# Patient Record
Sex: FEMALE | Race: WHITE
[De-identification: names, ages, dates, MRNs, and addresses within clinical notes are randomized per-mention and may not be internally consistent; named-entity substitution may affect disease eponyms.]

---

## 2021-02-16 VITALS
DIASTOLIC BLOOD PRESSURE: 73 MMHG | SYSTOLIC BLOOD PRESSURE: 123 MMHG | SYSTOLIC BLOOD PRESSURE: 123 MMHG | DIASTOLIC BLOOD PRESSURE: 73 MMHG | SYSTOLIC BLOOD PRESSURE: 123 MMHG | DIASTOLIC BLOOD PRESSURE: 73 MMHG

## 2021-04-09 ENCOUNTER — HOSPITAL ENCOUNTER (OUTPATIENT)
Dept: HOSPITAL 63 - RAD | Age: 51
End: 2021-04-09
Attending: NURSE PRACTITIONER
Payer: COMMERCIAL

## 2021-04-09 DIAGNOSIS — M76.892: ICD-10-CM

## 2021-04-09 DIAGNOSIS — M25.462: ICD-10-CM

## 2021-04-09 DIAGNOSIS — M25.062: Primary | ICD-10-CM

## 2021-04-09 PROCEDURE — 73562 X-RAY EXAM OF KNEE 3: CPT

## 2021-04-09 NOTE — RAD
3 views left knee



HISTORY: Left knee pain



AP lateral oblique views



There is minimal marginal spurring of the lateral and medial compartments. There is no interruption o
f cortex to suggest a fracture. There is a moderate sized dense effusion seen on the lateral view.



IMPRESSION:



Moderate hemarthrosis. There is no acute bony abnormality seen however a rapid hemarthrosis can be se
condary to an occult tibial plateau fracture or an ACL tear.



Electronically signed by: Adrian Machado III, MD (4/9/2021 4:57 PM) [facility]

## 2022-03-01 ENCOUNTER — HOSPITAL ENCOUNTER (OUTPATIENT)
Dept: HOSPITAL 63 - US | Age: 52
End: 2022-03-01
Attending: FAMILY MEDICINE
Payer: COMMERCIAL

## 2022-03-01 DIAGNOSIS — R94.6: ICD-10-CM

## 2022-03-01 DIAGNOSIS — E03.9: ICD-10-CM

## 2022-03-01 DIAGNOSIS — E04.1: Primary | ICD-10-CM

## 2022-03-01 PROCEDURE — 76536 US EXAM OF HEAD AND NECK: CPT

## 2022-03-01 NOTE — RAD
US THYROID



History: Hypothyroidism



Comparison:  None.



Technique: Multiple grayscale and color Doppler images of the thyroid gland were obtained.



Findings: 

Right thyroid lobe: 3.9 x 1.6 x 1.8 cm. 

Left thyroid lobe: 4.1 x 1.6 x 1.6 cm. 

Isthmus: 0.2 cm.

The thyroid gland is relatively homogeneous with normal vascularity. There is a single left thyroid n
odule.

 

Nodule #1.

Size: 5 x 4 x 5 mm

Location: Left lower.

Characteristics: Solid, hypoechoic, circumscribed, wider than tall, no echogenic foci

ACR TI-RADS risk category: TR4 (4-6 points): FNA if 1.5 cm, follow-up if 1-1.4 cm in 1, 2, 3, and 5 y
ears.

Disposition: No follow-up required.



IMPRESSION:

1.  Normal-sized relatively homogeneous thyroid with a single nodule which is below size threshold fo
r follow-up or FNA.



ACR Thyroid Imaging, Reporting And Data System (TI-RADS): White Paper Of The ACR TI-RADS Committee. J
ournal of the American College of Radiology, volume 14, issue 5, pages 587-595 (May 2017).



Electronically signed by: Janes Reyes MD (3/1/2022 9:37 AM) [id number]

## 2022-05-02 ENCOUNTER — HOSPITAL ENCOUNTER (OUTPATIENT)
Dept: HOSPITAL 63 - CT | Age: 52
End: 2022-05-02
Attending: FAMILY MEDICINE
Payer: COMMERCIAL

## 2022-05-02 DIAGNOSIS — R10.84: ICD-10-CM

## 2022-05-02 DIAGNOSIS — R63.5: Primary | ICD-10-CM

## 2022-05-02 DIAGNOSIS — M51.37: ICD-10-CM

## 2022-05-02 DIAGNOSIS — Z90.710: ICD-10-CM

## 2022-05-02 DIAGNOSIS — R60.9: ICD-10-CM

## 2022-05-02 DIAGNOSIS — R19.7: ICD-10-CM

## 2022-05-02 PROCEDURE — 74176 CT ABD & PELVIS W/O CONTRAST: CPT

## 2022-05-02 NOTE — RAD
Exam: CT abdomen/pelvis without intravenous contrast



Indication: Right upper quadrant pain and bloating, 30 pound weight gain in the last 2 months



Comparison: Chest abdomen pelvis 4/10/2018



Technique: Helical CT imaging performed of the abdomen and pelvis without the use of intravenous cont
rast. Sagittal and coronal reformats were obtained. 



One or more of the following individualized dose reduction techniques were utilized for this examinat
ion:  



1. Automated exposure control

2. Adjustment of the mA and/or kV according to patient size

3. Use of iterative reconstruction technique.



Findings:



Inherently limited evaluation without intravenous contrast.



Lower chest: Mild atelectasis in the right middle lobe. The heart is normal in size.



Liver: Normal noncontrast appearance of the liver.



Gallbladder/Biliary Tree: Normal.



Pancreas: Normal.



Spleen: Normal.



Adrenal Glands: Normal.



Kidneys/Ureters/Bladder: Normal. No hydronephrosis or nephrolithiasis.



Reproductive Organs: Uterus is surgically absent. No adnexal mass. A probable small right ovarian cys
t seen on 4/10/2018 has resolved. 



Stomach, small bowel, and colon: The stomach is normal. There is no small bowel obstruction. Grossly 
unremarkable colon, evaluation mildly limited by motion artifact. The appendix is normal.



Vasculature: No aortic aneurysm.



Lymph Nodes: No lymphadenopathy.



Peritoneum and retroperitoneum: No free fluid or free air.



Bones: No acute osseous abnormality. There is degenerative disc disease at L5-S1.



Miscellaneous: None



IMPRESSION:  No acute abnormality in the abdomen and pelvis.



Electronically signed by: Abiad Duff MD (5/2/2022 5:09 PM) [id number]

## 2022-05-03 ENCOUNTER — HOSPITAL ENCOUNTER (EMERGENCY)
Dept: HOSPITAL 63 - ER | Age: 52
Discharge: HOME | End: 2022-05-03
Payer: COMMERCIAL

## 2022-05-03 VITALS — SYSTOLIC BLOOD PRESSURE: 121 MMHG | DIASTOLIC BLOOD PRESSURE: 57 MMHG

## 2022-05-03 VITALS — BODY MASS INDEX: 42.15 KG/M2 | WEIGHT: 246.92 LBS | HEIGHT: 64 IN

## 2022-05-03 DIAGNOSIS — N30.90: Primary | ICD-10-CM

## 2022-05-03 DIAGNOSIS — M19.90: ICD-10-CM

## 2022-05-03 DIAGNOSIS — Z98.890: ICD-10-CM

## 2022-05-03 DIAGNOSIS — I25.2: ICD-10-CM

## 2022-05-03 DIAGNOSIS — Z88.2: ICD-10-CM

## 2022-05-03 DIAGNOSIS — Z91.018: ICD-10-CM

## 2022-05-03 DIAGNOSIS — E03.9: ICD-10-CM

## 2022-05-03 DIAGNOSIS — Z91.013: ICD-10-CM

## 2022-05-03 DIAGNOSIS — G43.909: ICD-10-CM

## 2022-05-03 DIAGNOSIS — Z91.041: ICD-10-CM

## 2022-05-03 DIAGNOSIS — I10: ICD-10-CM

## 2022-05-03 DIAGNOSIS — Z90.710: ICD-10-CM

## 2022-05-03 DIAGNOSIS — Z88.0: ICD-10-CM

## 2022-05-03 DIAGNOSIS — Z90.722: ICD-10-CM

## 2022-05-03 DIAGNOSIS — Z88.1: ICD-10-CM

## 2022-05-03 DIAGNOSIS — J45.909: ICD-10-CM

## 2022-05-03 LAB
ALBUMIN SERPL-MCNC: 3.6 G/DL (ref 3.4–5)
ALBUMIN/GLOB SERPL: 1.2 {RATIO} (ref 1–1.7)
ALP SERPL-CCNC: 105 U/L (ref 46–116)
ALT SERPL-CCNC: 40 U/L (ref 14–59)
ANION GAP SERPL CALC-SCNC: 6 MMOL/L (ref 6–14)
APTT PPP: YELLOW S
AST SERPL-CCNC: 21 U/L (ref 15–37)
BACTERIA #/AREA URNS HPF: (no result) /HPF
BASOPHILS # BLD AUTO: 0 X10^3/UL (ref 0–0.2)
BASOPHILS NFR BLD: 1 % (ref 0–3)
BILIRUB SERPL-MCNC: 1.5 MG/DL (ref 0.2–1)
BUN/CREAT SERPL: 10 (ref 6–20)
CA-I SERPL ISE-MCNC: 8 MG/DL (ref 7–20)
CALCIUM SERPL-MCNC: 10.8 MG/DL (ref 8.5–10.1)
CHLORIDE SERPL-SCNC: 105 MMOL/L (ref 98–107)
CO2 SERPL-SCNC: 29 MMOL/L (ref 21–32)
CREAT SERPL-MCNC: 0.8 MG/DL (ref 0.6–1)
EOSINOPHIL NFR BLD: 0.1 X10^3/UL (ref 0–0.7)
EOSINOPHIL NFR BLD: 3 % (ref 0–3)
ERYTHROCYTE [DISTWIDTH] IN BLOOD BY AUTOMATED COUNT: 13.1 % (ref 11.5–14.5)
FIBRINOGEN PPP-MCNC: (no result) MG/DL
GFR SERPLBLD BASED ON 1.73 SQ M-ARVRAT: 75.9 ML/MIN
GLOBULIN SER-MCNC: 3 G/DL (ref 2.2–3.8)
GLUCOSE SERPL-MCNC: 100 MG/DL (ref 70–99)
GLUCOSE UR STRIP-MCNC: (no result) MG/DL
HCT VFR BLD CALC: 37.7 % (ref 36–47)
HGB BLD-MCNC: 12.8 G/DL (ref 12–15.5)
LIPASE: 119 U/L (ref 73–393)
LYMPHOCYTES # BLD: 1.3 X10^3/UL (ref 1–4.8)
LYMPHOCYTES NFR BLD AUTO: 36 % (ref 24–48)
MCH RBC QN AUTO: 30 PG (ref 25–35)
MCHC RBC AUTO-ENTMCNC: 34 G/DL (ref 31–37)
MCV RBC AUTO: 87 FL (ref 79–100)
MONO #: 0.4 X10^3/UL (ref 0–1.1)
MONOCYTES NFR BLD: 10 % (ref 0–9)
NEUT #: 1.9 X10^3UL (ref 1.8–7.7)
NEUTROPHILS NFR BLD AUTO: 50 % (ref 31–73)
NITRITE UR QL STRIP: (no result)
PLATELET # BLD AUTO: 234 X10^3/UL (ref 140–400)
POTASSIUM SERPL-SCNC: 3.5 MMOL/L (ref 3.5–5.1)
PROT SERPL-MCNC: 6.6 G/DL (ref 6.4–8.2)
RBC # BLD AUTO: 4.34 X10^6/UL (ref 3.5–5.4)
RBC #/AREA URNS HPF: 0 /HPF (ref 0–2)
SODIUM SERPL-SCNC: 140 MMOL/L (ref 136–145)
SP GR UR STRIP: 1.01
SQUAMOUS #/AREA URNS LPF: (no result) /LPF
UROBILINOGEN UR-MCNC: 0.2 MG/DL
WBC # BLD AUTO: 3.7 X10^3/UL (ref 4–11)
WBC #/AREA URNS HPF: (no result) /HPF (ref 0–4)

## 2022-05-03 PROCEDURE — 83880 ASSAY OF NATRIURETIC PEPTIDE: CPT

## 2022-05-03 PROCEDURE — 96374 THER/PROPH/DIAG INJ IV PUSH: CPT

## 2022-05-03 PROCEDURE — 87186 SC STD MICRODIL/AGAR DIL: CPT

## 2022-05-03 PROCEDURE — 83690 ASSAY OF LIPASE: CPT

## 2022-05-03 PROCEDURE — 96376 TX/PRO/DX INJ SAME DRUG ADON: CPT

## 2022-05-03 PROCEDURE — 80053 COMPREHEN METABOLIC PANEL: CPT

## 2022-05-03 PROCEDURE — 36415 COLL VENOUS BLD VENIPUNCTURE: CPT

## 2022-05-03 PROCEDURE — 87077 CULTURE AEROBIC IDENTIFY: CPT

## 2022-05-03 PROCEDURE — 87086 URINE CULTURE/COLONY COUNT: CPT

## 2022-05-03 PROCEDURE — 85025 COMPLETE CBC W/AUTO DIFF WBC: CPT

## 2022-05-03 PROCEDURE — 96375 TX/PRO/DX INJ NEW DRUG ADDON: CPT

## 2022-05-03 PROCEDURE — 99285 EMERGENCY DEPT VISIT HI MDM: CPT

## 2022-05-03 PROCEDURE — 81001 URINALYSIS AUTO W/SCOPE: CPT

## 2022-05-03 NOTE — PHYS DOC
Past History


Past Medical History:  Arthritis, Asthma, Cancer, Hypertension, Hypothyroid, MI,

Migraines


Additional Past Medical Histor:  lupus, Raynauds, Sjogrens, urinary reflux


 (ANGELIKA KAN)


Past Surgical History:  Cancer Surgery, Cervical Fusion, , 

Hysterectomy, Oophorectomy, Other


Additional Past Surgical Histo:  R lumpectomy, uterine/cervical CA, rotator 

cuff,


 (ANGELIKA KAN)


Smoking:  Non-smoker


Alcohol Use:  None


Drug Use:  None


 (ANGELIKA KAN)





General Adult


EDM:


Chief Complaint:  ABDOMINAL PAIN





HPI:


HPI:





Patient is a 52-year-old female presents with right upper quadrant abdominal 

pain.  Patient states that she has had the pain for a few weeks.  Pain is worse 

after eating.  Patient reports that her poop has been oily and yellow in color. 

Patient states that pain is worse when she lays down and feels like she is 

swollen.  Patient's been taking Tylenol at home with no relief.  Patient had a 

CT and ultrasound done this week which both were negative for acute findings.


 (ANGELIKA KAN)





Review of Systems:


Review of Systems:


ROS


At least 10 ROS systems have been reviewed and are negative except as documented

in the HPI.


General: Negative except as outlined in HPI above.


Skin: Negative except as outlined in HPI above.


HEENT: Negative except as outlined in HPI above.


Neck: Negative except as outlined in HPI above.


Respiratory: Negative except as outlined in HPI above..


Cardiovascular: Negative except as outlined in HPI above.


Abdomen: Negative except as outlined in HPI above.


: Negative except as outlined in HPI above.


Back/MSK: Negative except as outlined in HPI above.


Neuro: Negative except as outlined in HPI above.


Psych: Negative except as outlined in HPI above.


 (ANGELIKA KAN)





Current Medications:


Current Meds:





Current Medications








 Medications


  (Trade)  Dose


 Ordered  Sig/Rand  Start Time


 Stop Time Status Last Admin


Dose Admin


 


 Morphine Sulfate


  (Morphine 4mg


 Syringe)  4 mg  1X  ONCE  5/3/22 12:45


 5/3/22 12:58 DC 5/3/22 13:13


4 MG


 


 Ondansetron HCl


  (Zofran)  4 mg  1X  ONCE  5/3/22 12:45


 5/3/22 12:58 DC 5/3/22 13:13


4 MG








 (KAN,ANGELIKA APRN)





Allergies:


Allergies:





Allergies








Coded Allergies Type Severity Reaction Last Updated Verified


 


  Iodinated Contrast Media Allergy Severe Anaphylaxis 21 Yes


 


  banana Allergy Severe Anaphylaxis 21 Yes


 


  fish derived Allergy Severe  21 Yes


 


  bill Allergy Severe  21 Yes


 


  nut - unspecified Allergy Severe Anaphylaxis 21 Yes


 


  strawberry Allergy Severe Anaphylaxis 21 Yes


 


  Penicillins Allergy Intermediate  4/10/18 Yes


 


  Sulfa (Sulfonamide Antibiotics) Allergy Intermediate  4/10/18 Yes


 


  ciprofloxacin Allergy Intermediate  4/10/18 Yes


 


  clarithromycin Allergy Intermediate  21 Yes


 


  hydrocodone Allergy Intermediate  21 Yes


 


  ibuprofen Allergy Intermediate  21 Yes


 


  latex Allergy Intermediate  21 Yes


 


  levofloxacin Allergy Intermediate  21 Yes


 


  shellfish derived Allergy Intermediate  21 Yes


 


  NSAIDS (Non-Steroidal Anti-Inflamma Allergy Unknown  5/3/22 Yes








 (ANGELIKA KAN APRN)





Physical Exam:


PE:





Constitutional: Well developed, well nourished, no acute distress, non-toxic 

appearance. []


HENT: Normocephalic, atraumatic, bilateral external ears normal, oropharynx 

moist, no oral exudates, nose normal. []


Eyes: PERRLA, EOMI, conjunctiva normal, no discharge. [] 


Neck: Normal range of motion, no tenderness, supple, no stridor. [] 


Cardiovascular:Heart rate regular rhythm, no murmur []


Lungs & Thorax:  Bilateral breath sounds clear to auscultation []


Abdomen: Bowel sounds normal, soft, right upper quadrant tenderness


Skin: Warm, dry, no erythema, no rash. [] 


Back: No tenderness, no CVA tenderness. [] 


Extremities: No tenderness, no cyanosis, no clubbing, ROM intact, no edema. [] 


Neurologic: Alert and oriented X 3, normal motor function, normal sensory 

function, no focal deficits noted. []


Psychologic: Affect normal, judgement normal, mood normal. []


 (ANGELIKA KAN APRN)





Current Patient Data:


Labs:





                                Laboratory Tests








Test


 5/3/22


12:45


 


White Blood Count


 3.7 x10^3/uL


(4.0-11.0)  L


 


Red Blood Count


 4.34 x10^6/uL


(3.50-5.40)


 


Hemoglobin


 12.8 g/dL


(12.0-15.5)


 


Hematocrit


 37.7 %


(36.0-47.0)


 


Mean Corpuscular Volume


 87 fL ()





 


Mean Corpuscular Hemoglobin 30 pg (25-35)  


 


Mean Corpuscular Hemoglobin


Concent 34 g/dL


(31-37)


 


Red Cell Distribution Width


 13.1 %


(11.5-14.5)


 


Platelet Count


 234 x10^3/uL


(140-400)


 


Neutrophils (%) (Auto) 50 % (31-73)  


 


Lymphocytes (%) (Auto) 36 % (24-48)  


 


Monocytes (%) (Auto) 10 % (0-9)  H


 


Eosinophils (%) (Auto) 3 % (0-3)  


 


Basophils (%) (Auto) 1 % (0-3)  


 


Neutrophils # (Auto)


 1.9 x10^3uL


(1.8-7.7)


 


Lymphocytes # (Auto)


 1.3 x10^3/uL


(1.0-4.8)


 


Monocytes # (Auto)


 0.4 x10^3/uL


(0.0-1.1)


 


Eosinophils # (Auto)


 0.1 x10^3/uL


(0.0-0.7)


 


Basophils # (Auto)


 0.0 x10^3/uL


(0.0-0.2)


 


Sodium Level


 140 mmol/L


(136-145)


 


Potassium Level


 3.5 mmol/L


(3.5-5.1)


 


Chloride Level


 105 mmol/L


()


 


Carbon Dioxide Level


 29 mmol/L


(21-32)


 


Anion Gap 6 (6-14)  


 


Blood Urea Nitrogen


 8 mg/dL (7-20)





 


Creatinine


 0.8 mg/dL


(0.6-1.0)


 


Estimated GFR


(Cockcroft-Gault) 75.9  





 


BUN/Creatinine Ratio 10 (6-20)  


 


Glucose Level


 100 mg/dL


(70-99)  H


 


Calcium Level


 10.8 mg/dL


(8.5-10.1)  H


 


Total Bilirubin


 1.5 mg/dL


(0.2-1.0)  H


 


Aspartate Amino Transferase


(AST) 21 U/L (15-37)





 


Alanine Aminotransferase (ALT)


 40 U/L (14-59)





 


Alkaline Phosphatase


 105 U/L


()


 


NT-Pro-B-Type Natriuretic


Peptide 6 pg/mL


(0-124)


 


Total Protein


 6.6 g/dL


(6.4-8.2)


 


Albumin


 3.6 g/dL


(3.4-5.0)


 


Albumin/Globulin Ratio 1.2 (1.0-1.7)  


 


Lipase


 119 U/L


()








Vital Signs:





                                   Vital Signs








  Date Time  Temp Pulse Resp B/P (MAP) Pulse Ox O2 Delivery O2 Flow Rate FiO2


 


5/3/22 13:13   16  94 Room Air  


 


5/3/22 12:00 98.1 97  157/61 (93)    








 (ANGELIKA KAN)





EKG:


EKG:


[]


 (ANGELIKA KAN)





Radiology/Procedures:


Radiology/Procedures:


[]


 (ANGELIKA KAN)





Heart Score:


C/O Chest Pain:  No


Risk Factors:


Risk Factors:  DM, Current or recent (<one month) smoker, HTN, HLP, family 

history of CAD, obesity.


Risk Scores:


Score 0 - 3:  2.5% MACE over next 6 weeks - Discharge Home


Score 4 - 6:  20.3% MACE over next 6 weeks - Admit for Clinical Observation


Score 7 - 10:  72.7% MACE over next 6 weeks - Early Invasive Strategies


 (ANGELIKA KAN)





Course & Med Decision Making:


Course & Med Decision Making


Pertinent Labs and Imaging studies reviewed. (See chart for details)





[] 52-year-old female presents with right upper quadrant abdominal pain, yellow 

stool, swelling to bilateral legs.  Patient had a CT done yesterday which showed

 no acute findings.  No sludge or gallstones seen.  Work-up in ER consist of 

CBC, CMP, urinalysis.





All labs were unremarkable. I discussed with patient that she did not need 

further imaging done.  She most likely needed to have a PIPIDA scan done to 

study the way her gallbladder is working.  Advised patient he she needs to 

follow-up with her PCP and a GI doctor for scheduling of outpatient exam. Urine 

is positive for nitrates, leuks, WBC.  Patient is appreciative and okay with 

discharge plan.  Sending patient home with some pain medication as well.  

Ibuprofen for breakthrough pain.  Discussed return precautions.





 (ANGELIKA KAN)





Dragon Disclaimer:


Dragon Disclaimer:


This electronic medical record was generated, in whole or in part, using a voice

 recognition dictation system.


 (ANGELIKA KAN)


Attending Co-Sign


The patient was seen and interviewed as well as examined at the bedside. The 

chart was reviewed. The case was discussed. Agree with the plan of care.


 (YANIV CAIN DO)





Departure


Departure:


Impression:  


   Primary Impression:  


   Abdominal pain


   Qualified Codes:  R10.9 - Unspecified abdominal pain


   Additional Impression:  


   Cystitis


Disposition:   HOME / SELF CARE / HOMELESS


Condition:  STABLE


Referrals:  


KASEY YOUNG MD (PCP)


Patient Instructions:  Abdominal Pain, Easy-to-Read





Additional Instructions:  


You were seen emergency room for right upper quadrant abdominal pain.  All labs 

unremarkable.  No reason for repeat imaging since you just had a CT done 

yesterday.  There were no signs of sludge or stones at that time.  You need to 

follow-up with your PCP to get a referral for GI doctor for a PIPIDA scan to be 

done.  Sending you home with some pain medication and an antibiotic for UTI.  

Please return emergency room if you have worsening symptoms or concerns.





EMERGENCY DEPARTMENT GENERAL DISCHARGE INSTRUCTIONS





Thank you for coming to [facility] (ED) today and trusting us

 with you 


care.  We trust that you had a positivie experience in our Emergency Department.

  If you 


wish to speak to the department management, you may call the director at 

[phone].





YOUR FOLLOW UP INSTRUCTIONS ARE AS FOLLOWS:





1.  Do you have a private Doctor?  If you do not have a private doctor, please 

ask for a 


resource list of physicians or clinics that may be able to assist you with 

follow up care.





2.  The Emergency Physician has interpreted your x-rays.  The X-Ray specialist 

will also 


review them.  If there is a change in the findings, you will be notified in 48 

hours when at 


all possible.





3.  A lab test or culture has been done, your results will be reviewed and you 

will be 


notified if you need a change in treatment.





ADDITIONAL INSTRUCTIONS AND INFORMATION:





1.  Your care today has been supervised by a physician who is specially trained 

in emergency 


care.  Many problems require more than one evaluation for a complete diagnosis 

and 


treatment.  We recommend that you schedule your follow up appointment as 

recommended to 


ensure complete treatment of you illness or injury.  If you are unable to obtain

 follow up 


care and continue to have a problem, or if your condition worsens, we recommend 

that you 


return to the ED.





2.  We are not able to safely determine your condition over the phone nor are we

 able to 


give sound medical advice over the phone.  For these safety reasons, if you call

 for medical 


advice we will ask you to come to the ED for further evaluation.





3.  If you have any questions regarding these discharge instructions please call

 the ED at 


[phone].





SAFETY INFORMATION:





In the interest of safety, wellness, and injury prevention; we encourage you to 

wear your 


sealbelt, if you smoke; quite smoking, and we encourage family to use a 

protective helmet 


for bicycling and other sporting events that present an increased risk for head 

injury.





IF YOUR SYMPTOMS WORSEN OR NEW SYMPTOMS DEVELOP, OR YOU HAVE CONCERNS ABOUT YOUR

 CONDITION; 


OR IF YOUR CONDITION WORSENS WHILE YOU ARE WAITING FOR YOUR FOLLOW UP 

APPOINTMENT; EITHER 


CONTACT YOUR PRIMARY CARE DOCTOR, THE PHYSICIAN WHOSE NAME AND NUMBER YOU WERE 

GIVEN, OR 


RETURN TO THE ED IMMEDIATELY.


Scripts


Hydrocodone/Acetaminophen (Hydrocodone-Acetamin 5-325 mg) 1 Each Tablet


1 EACH PO BID for uti for 7 Days, #14 TAB


   Prov: ANGELIKA KAN         5/3/22 


Cephalexin (CEPHALEXIN) 500 Mg Tablet


1 TAB PO BID for uti for 7 Days, #14 TAB


   Prov: ANGELIKA KAN         5/3/22











ANGELIKA KAN                May 3, 2022 14:43


YANIV CAIN DO                  May 5, 2022 20:49